# Patient Record
Sex: FEMALE | Race: WHITE | URBAN - METROPOLITAN AREA
[De-identification: names, ages, dates, MRNs, and addresses within clinical notes are randomized per-mention and may not be internally consistent; named-entity substitution may affect disease eponyms.]

---

## 2017-05-04 ENCOUNTER — OFFICE VISIT (OUTPATIENT)
Dept: ORTHOPEDIC SURGERY | Age: 22
End: 2017-05-04

## 2017-05-04 VITALS — BODY MASS INDEX: 21.66 KG/M2 | HEIGHT: 65 IN | WEIGHT: 130 LBS

## 2017-05-04 DIAGNOSIS — M79.671 RIGHT FOOT PAIN: ICD-10-CM

## 2017-05-04 DIAGNOSIS — M84.374A STRESS FRACTURE OF RIGHT FOOT, INITIAL ENCOUNTER: Primary | ICD-10-CM

## 2017-05-04 PROCEDURE — L4361 PNEUMA/VAC WALK BOOT PRE OTS: HCPCS | Performed by: ORTHOPAEDIC SURGERY

## 2017-05-04 PROCEDURE — 99203 OFFICE O/P NEW LOW 30 MIN: CPT | Performed by: ORTHOPAEDIC SURGERY

## 2017-05-04 RX ORDER — CLONAZEPAM 0.5 MG/1
TABLET ORAL
Refills: 3 | COMMUNITY
Start: 2017-04-25

## 2017-05-04 RX ORDER — SERTRALINE HYDROCHLORIDE 100 MG/1
TABLET, FILM COATED ORAL
COMMUNITY
Start: 2016-04-14 | End: 2018-07-03

## 2017-05-04 ASSESSMENT — ENCOUNTER SYMPTOMS
GASTROINTESTINAL NEGATIVE: 1
BACK PAIN: 0

## 2017-06-01 ENCOUNTER — OFFICE VISIT (OUTPATIENT)
Dept: ORTHOPEDIC SURGERY | Age: 22
End: 2017-06-01

## 2017-06-01 VITALS
HEART RATE: 73 BPM | DIASTOLIC BLOOD PRESSURE: 67 MMHG | HEIGHT: 65 IN | SYSTOLIC BLOOD PRESSURE: 119 MMHG | WEIGHT: 130 LBS | BODY MASS INDEX: 21.66 KG/M2

## 2017-06-01 DIAGNOSIS — M84.374D STRESS FRACTURE OF RIGHT FOOT WITH ROUTINE HEALING, SUBSEQUENT ENCOUNTER: ICD-10-CM

## 2017-06-01 DIAGNOSIS — M79.671 RIGHT FOOT PAIN: Primary | ICD-10-CM

## 2017-06-01 PROCEDURE — 99213 OFFICE O/P EST LOW 20 MIN: CPT | Performed by: ORTHOPAEDIC SURGERY

## 2018-07-03 ENCOUNTER — OFFICE VISIT (OUTPATIENT)
Dept: ORTHOPEDIC SURGERY | Age: 23
End: 2018-07-03

## 2018-07-03 VITALS
WEIGHT: 166.6 LBS | SYSTOLIC BLOOD PRESSURE: 115 MMHG | HEIGHT: 66 IN | HEART RATE: 69 BPM | BODY MASS INDEX: 26.78 KG/M2 | DIASTOLIC BLOOD PRESSURE: 77 MMHG

## 2018-07-03 DIAGNOSIS — M79.662 PAIN IN LEFT SHIN: Primary | ICD-10-CM

## 2018-07-03 DIAGNOSIS — S86.899A SHIN SPLINTS, INITIAL ENCOUNTER: ICD-10-CM

## 2018-07-03 DIAGNOSIS — M79.661 PAIN IN RIGHT SHIN: ICD-10-CM

## 2018-07-03 PROCEDURE — 99214 OFFICE O/P EST MOD 30 MIN: CPT | Performed by: ORTHOPAEDIC SURGERY

## 2018-07-03 ASSESSMENT — ENCOUNTER SYMPTOMS
RESPIRATORY NEGATIVE: 1
GASTROINTESTINAL NEGATIVE: 1
EYES NEGATIVE: 1

## 2018-07-03 NOTE — LETTER
[facility] [address] [facility] [address]  Phone: [phone]  Fax: [phone]    Kike Pardo MD  July 16, 2018     Enid Stein  [address] [address] [address]  [facility] Allegra Munoz    Patient: Darlene Sainz  MR Number: [id number]  YOB: 1995  Date of Visit: 7/3/2018    Dear Dr. Enid Stein: Below are the relevant portions of my assessment and plan of care. Subjective:      Patient ID: Darlene Sainz is a 23 y.o. female. Leg Pain      Darlene Sainz presents today for evaluation of bilateral shin pain. Pain started about a week ago. Left is worse than right. It hurts in the medial aspect of both shins. She has pain that can be as bad as 7 out of 10 with running. She has been on her feet all day and she will limp with walking. She has not had treatment. She does not take medications. She did recently get new shoes. She has been working out every day doing cardio and weights. She runs about 10 miles a week. She does not recall specific trauma. About 1 year ago I treated her for a stress injury in the right foot. Review of Systems   Constitutional: Negative. HENT: Negative. Eyes: Negative. Respiratory: Negative. Cardiovascular: Negative. Gastrointestinal: Negative. Endocrine: Negative. Genitourinary: Negative. Musculoskeletal: Positive for arthralgias. Skin: Negative. Allergic/Immunologic: Positive for environmental allergies. Negative for food allergies. Neurological: Negative. Hematological: Negative. Psychiatric/Behavioral: The patient is nervous/anxious. Objective:   Physical Exam  General Exam:    Vitals: Blood pressure 115/77, pulse 69, height 5' 6\" (1.676 m), weight 166 lb 9.6 oz (75.6 kg). Constitutional: Patient is adequately groomed with no evidence of malnutrition  Mental Status: The patient is oriented to time, place and person. The patient's mood and affect are appropriate. will proceed with an MRI. All of her questions have been answered. She agrees with this plan. This note was created using voice recognition software. It has been proofread, but occasionally errors remain. Please disregard these errors. They will be corrected as they are noted. If you have questions, please do not hesitate to call me. I look forward to following Joon Ball along with you.     Sincerely,      Claudene Lerner, MD

## 2018-07-16 NOTE — COMMUNICATION BODY
Subjective:      Patient ID: Janet Keith is a 23 y.o. female. Leg Pain      Janet Keith presents today for evaluation of bilateral shin pain. Pain started about a week ago. Left is worse than right. It hurts in the medial aspect of both shins. She has pain that can be as bad as 7 out of 10 with running. She has been on her feet all day and she will limp with walking. She has not had treatment. She does not take medications. She did recently get new shoes. She has been working out every day doing cardio and weights. She runs about 10 miles a week. She does not recall specific trauma. About 1 year ago I treated her for a stress injury in the right foot. Review of Systems   Constitutional: Negative. HENT: Negative. Eyes: Negative. Respiratory: Negative. Cardiovascular: Negative. Gastrointestinal: Negative. Endocrine: Negative. Genitourinary: Negative. Musculoskeletal: Positive for arthralgias. Skin: Negative. Allergic/Immunologic: Positive for environmental allergies. Negative for food allergies. Neurological: Negative. Hematological: Negative. Psychiatric/Behavioral: The patient is nervous/anxious. Objective:   Physical Exam  General Exam:    Vitals: Blood pressure 115/77, pulse 69, height 5' 6\" (1.676 m), weight 166 lb 9.6 oz (75.6 kg). Constitutional: Patient is adequately groomed with no evidence of malnutrition  Mental Status: The patient is oriented to time, place and person. The patient's mood and affect are appropriate. Gait:  Patient walks with normal gait and station. Lymphatic: The lymphatic examination bilaterally reveals all areas to be without enlargement or induration. Vascular: Examination reveals no swelling or calf tenderness. Peripheral pulses are palpable and 2+. Neurological: The patient has good coordination. There is no weakness or sensory deficit.     Skin:    Head/Neck: inspection reveals no rashes, ulcerations or lesions. Trunk:  inspection reveals no rashes, ulcerations or lesions. Right Lower Extremity: inspection reveals no rashes, ulcerations or lesions. Left Lower Extremity: inspection reveals no rashes, ulcerations or lesions. She has valgus alignment of the ankles bilaterally with moderate pes planus. On the right side she has tenderness over the anterior medial border of the tibia at the junction of the middle and distal third. There is no obvious deformity. She has no swelling. Calf is soft. Left side is symmetric to the right with a little bit more intense discomfort. Pain is at the junction of the middle and distal third the anterior medial border of the tibia. Calf is soft. Bilaterally she has no fibular pain or anterior tibial pain. Neurologic and vascular exams of both lower extremities are normal.    X-rays were obtained today AP and lateral views of the bilateral shins. These demonstrate:  No bony abnormalities. Assessment:      Bilateral shin splints        Plan:      I explained to the patient the etiology and treatment of this condition. I'm recommending a threefold approach. First is dedicated stretching. I demonstrated four different options one stretches to the patient and the office today. Second is activity modification. She will avoid running and Instead do biking, elliptical training, rowing, or swimming. She will use ice. Furthermore, she will consider power step inserts to offload her hindfoot valgus. If she has ongoing trouble in a month we will proceed with an MRI. All of her questions have been answered. She agrees with this plan. This note was created using voice recognition software. It has been proofread, but occasionally errors remain. Please disregard these errors. They will be corrected as they are noted.

## 2020-01-30 NOTE — PROGRESS NOTES
Chief Complaint   Patient presents with   Natalie Hassan New Doctor   [facility]     both eyes x 6 months on/ off     Hypertension     x4 months          HPI:  Neida Sousa is a 27 y.o. (: 1995) here today to establish care. Has history of general anxiety disorder with panic attacks. Has been on Zoloft in the past and most recently on Cymbalta 30 mg daily. Seeing a doc at [facility]. Goes about once per month. Dry eye: Last 6 months patient states that she has been having dry eye or allergies. Only happens towards the end of the day when her eyes feel like they need to be itched or needs some lubrication. When she rubs them her eyes will water and then her eyes feel a little bit better. No other symptoms like runny nose, cough or hives. Received 2 HPV vaccinations in the past year. Needs to complete series with third dose. Patient has never been sexually active and has never had a Pap smear. She will think about establishing with gynecology. Discussed increasing water intake and making healthy dietary choices. Review of Systems   Constitutional: Negative for activity change, appetite change, chills, fatigue, fever and unexpected weight change. HENT: Negative for congestion, postnasal drip, rhinorrhea, sinus pressure, sinus pain, sneezing and sore throat. Eyes: Negative for visual disturbance. Respiratory: Negative for cough, chest tightness, shortness of breath and wheezing. Cardiovascular: Negative for chest pain and palpitations. Gastrointestinal: Negative for abdominal pain, blood in stool, constipation, diarrhea, nausea and vomiting. Endocrine: Negative for cold intolerance, heat intolerance, polydipsia and polyuria. Genitourinary: Negative for dysuria, frequency, vaginal bleeding and vaginal discharge. Musculoskeletal: Negative for arthralgias, back pain, joint swelling, myalgias and neck pain. Skin: Negative for rash and wound.    Allergic/Immunologic: Negative for environmental allergies. Neurological: Negative for dizziness, tremors, syncope, weakness, light-headedness, numbness and headaches. Hematological: Negative for adenopathy. Psychiatric/Behavioral: Negative for behavioral problems, decreased concentration, sleep disturbance and suicidal ideas. The patient is not nervous/anxious. Past Medical History:   Diagnosis Date    Anxiety        Family History   Problem Relation Age of Onset    No Known Problems Mother     No Known Problems Father     No Known Problems Sister     No Known Problems Brother     No Known Problems Maternal Aunt     No Known Problems Maternal Uncle     No Known Problems Paternal Aunt     No Known Problems Paternal Uncle     No Known Problems Maternal Grandmother     No Known Problems Maternal Grandfather     No Known Problems Paternal Grandmother     No Known Problems Paternal Grandfather     No Known Problems Other     Diabetes Neg Hx     Anesth Problems Neg Hx     Broken Bones Neg Hx     Cancer Neg Hx     Clotting Disorder Neg Hx     Collagen Disease Neg Hx     Dislocations Neg Hx     Osteoporosis Neg Hx     Rheumatologic Disease Neg Hx     Scoliosis Neg Hx     Severe Sprains Neg Hx        Social History     Tobacco Use    Smoking status: Never Smoker    Smokeless tobacco: Never Used   Substance Use Topics    Alcohol use: Yes     Comment: rare    Drug use: Not on file       New Prescriptions    No medications on file       Meds Prior to visit:  Current Outpatient Medications on File Prior to Visit   Medication Sig Dispense Refill    DULoxetine (CYMBALTA) 30 MG extended release capsule Take 30 mg by mouth daily      clonazePAM (KLONOPIN) 0.5 MG tablet TAKE 1/2 TABLET PO QD PRF ANXIETY  3     No current facility-administered medications on file prior to visit.       No Known Allergies    OBJECTIVE:  /72   Pulse 82   Temp 98.1 °F (36.7 °C) (Oral)   Resp 12   Ht 5' 4.96\" (1.65 m)   Wt 172 lb (78 No organomegaly   Musculoskeletal: Normal range of motion. General: No tenderness. Comments: Intact in all extremities   Lymphadenopathy:      Cervical: No cervical adenopathy. Skin:     General: Skin is warm. Capillary Refill: Capillary refill takes less than 2 seconds. Findings: No erythema or rash. Neurological:      General: No focal deficit present. Mental Status: She is alert and oriented to person, place, and time. Mental status is at baseline. Cranial Nerves: No cranial nerve deficit. Sensory: No sensory deficit. Motor: No weakness or abnormal muscle tone. Coordination: Coordination normal.      Gait: Gait normal.      Deep Tendon Reflexes: Reflexes normal.   Psychiatric:         Mood and Affect: Mood normal.         Behavior: Behavior normal.         Thought Content: Thought content normal.         Judgment: Judgment normal.         ASSESSMENT/PLAN:  1. Encounter to establish care  VS reviewed and WNL    BMI reviewed   All questions answered. 2. Generalized anxiety disorder  History of anxiety. [facility] and now sees a doctor at [facility]. Was transitioned from Zoloft to Cymbalta. Cymbalta is working very well. Continue this for now  - DULoxetine (CYMBALTA) 30 MG extended release capsule; Take 30 mg by mouth daily    3. Need for HPV vaccination  Needs third dose  - HPV Vaccine 9-valent IM    4. Dry eye  Patient is likely dealing with dry eye. Recommended preservative free eyedrops. Discussed different brands like refresh optive. Will let me know, via MyChart, if she is doing better. If not we will reevaluate and possibly prescribe Restasis or refer to ophthalmology. 5.  Physical exam  F/u discussed. Healthy lifestyle modifications discussed. Discussed use, benefit, and side effects of prescribed medications. Barriers to medication compliance addressed. All patient questions answered. Pt voiced understanding.      RTC No follow-ups on file. No future appointments.     Harper Albright MD  1/31/2020  8:54 AM

## 2020-01-31 ENCOUNTER — OFFICE VISIT (OUTPATIENT)
Dept: PRIMARY CARE CLINIC | Age: 25
End: 2020-01-31
Payer: COMMERCIAL

## 2020-01-31 VITALS
RESPIRATION RATE: 12 BRPM | TEMPERATURE: 98.1 F | DIASTOLIC BLOOD PRESSURE: 72 MMHG | HEART RATE: 82 BPM | WEIGHT: 172 LBS | SYSTOLIC BLOOD PRESSURE: 109 MMHG | HEIGHT: 65 IN | OXYGEN SATURATION: 100 % | BODY MASS INDEX: 28.66 KG/M2

## 2020-01-31 PROCEDURE — 90651 9VHPV VACCINE 2/3 DOSE IM: CPT | Performed by: FAMILY MEDICINE

## 2020-01-31 PROCEDURE — 90471 IMMUNIZATION ADMIN: CPT | Performed by: FAMILY MEDICINE

## 2020-01-31 PROCEDURE — 99385 PREV VISIT NEW AGE 18-39: CPT | Performed by: FAMILY MEDICINE

## 2020-01-31 RX ORDER — DULOXETIN HYDROCHLORIDE 30 MG/1
30 CAPSULE, DELAYED RELEASE ORAL DAILY
COMMUNITY

## 2020-01-31 ASSESSMENT — ENCOUNTER SYMPTOMS
SINUS PRESSURE: 0
CONSTIPATION: 0
BLOOD IN STOOL: 0
NAUSEA: 0
DIARRHEA: 0
COUGH: 0
BACK PAIN: 0
VOMITING: 0
SORE THROAT: 0
CHEST TIGHTNESS: 0
RHINORRHEA: 0
SHORTNESS OF BREATH: 0
SINUS PAIN: 0
ABDOMINAL PAIN: 0
WHEEZING: 0

## 2020-01-31 ASSESSMENT — PATIENT HEALTH QUESTIONNAIRE - PHQ9
1. LITTLE INTEREST OR PLEASURE IN DOING THINGS: 0
2. FEELING DOWN, DEPRESSED OR HOPELESS: 0
SUM OF ALL RESPONSES TO PHQ9 QUESTIONS 1 & 2: 0
SUM OF ALL RESPONSES TO PHQ QUESTIONS 1-9: 0
SUM OF ALL RESPONSES TO PHQ QUESTIONS 1-9: 0

## 2020-02-10 ENCOUNTER — PATIENT MESSAGE (OUTPATIENT)
Dept: PRIMARY CARE CLINIC | Age: 25
End: 2020-02-10

## 2020-02-10 NOTE — TELEPHONE ENCOUNTER
From: Tank Quiñonez  To: Morro Linder MD  Sent: 2/10/2020 1:18 PM EST  Subject: Non-Urgent Medical Question    Hi. My current psychiatrist I was seeing for therapy just notified me he was leaving [place] for a job elsewhere. I will no longer be able to see him. Could you give me the names of a few Psychologists that I could be connected to? I believe that you mentioned one possibly being in your building. Thanks!

## 2020-04-13 ENCOUNTER — PATIENT MESSAGE (OUTPATIENT)
Dept: PRIMARY CARE CLINIC | Age: 25
End: 2020-04-13

## 2020-04-14 NOTE — TELEPHONE ENCOUNTER
From: Joon Berger  To: Juan Banegas MD  Sent: 4/13/2020 6:31 PM EDT  Subject: Non-Urgent Medical Question    No. I do not need to see Dr. Nataly Chambers tomorrow. What is the earliest available on May 11? Joon Berger      ----- Message -----   From:MAUREEN Ferrer   LPJV:0/10/2104 4:07 PM EDT   To:Lola Nolasco   Subject:RE: Non-Urgent Medical Question    Servando Larsen,    Your appointment with Dr Hannah Pimentel was scheduled for this morning. I am sorry but whoever you talked to in central scheduling this morning scheduled you with Dr Nataly Chambers for tomorrow. I can reschedule you to see Dr Hannah Pimentel on a Monday morning. Her first available is May 11th. That morning is pretty open just let me know what time works for you. Did you still want to see Dr. Nataly Chambers tomorrow? Kim         ----- Message -----   Shadi Escobedo   Sent:4/13/2020 3:51 PM EDT   To:Marilyn Bales MD   Subject:Non-Urgent Medical Question    Hi. I have an E-visit scheduled tomorrow with [facility]. The e-visit appointment in [address] lists you as the provider. I just wanted to make sure this was correct and that I would be able to connect with the E-visit properly. Thanks.     Joon Berger

## 2020-04-15 ENCOUNTER — PATIENT MESSAGE (OUTPATIENT)
Dept: PRIMARY CARE CLINIC | Age: 25
End: 2020-04-15

## 2020-04-16 ENCOUNTER — PATIENT MESSAGE (OUTPATIENT)
Dept: PRIMARY CARE CLINIC | Age: 25
End: 2020-04-16

## 2020-04-27 ENCOUNTER — TELEPHONE (OUTPATIENT)
Dept: PRIMARY CARE CLINIC | Age: 25
End: 2020-04-27

## 2020-05-11 ENCOUNTER — VIRTUAL VISIT (OUTPATIENT)
Dept: PSYCHOLOGY | Age: 25
End: 2020-05-11
Payer: COMMERCIAL

## 2020-05-11 PROCEDURE — 90791 PSYCH DIAGNOSTIC EVALUATION: CPT | Performed by: PSYCHOLOGIST

## 2020-05-11 NOTE — Clinical Note
Dr Marielena Freeman, Pt came from [address] [place] [address] psychiatry. I can refer for therapy. Would you be willing to manage duloxetine and clonazepam? Or do you want me to make referral for psychiatry as well?  Crystal

## 2020-05-11 NOTE — PROGRESS NOTES
Behavioral Health Consultation  Taya Barkley PsyD  Psychologist  5/11/2020  10:38 AM      NOTE - this visit conducted via audiovisual means : MyChart, Doxy, etc, in accordance with CMS guidelines. During TKBWN-65 public health emergency. Visit initiated at patient or caregiver request with permission to bill to insurer granted. Telemedicine APA guidelines were reviewed and discussed. Patient gave verbal consent for teleservices and will sign a consent form when feasible. Location of provider: [facility] care  Location of patient: home    Time spent with Patient: 25 minutes  This is patient's first  [facility] appointment. Reason for Consult:  anxiety  Referring Provider: Zoila Medellin MD  [facility]  2nd [address], [address]    Pt provided informed consent for the behavioral health program. Discussed with patient model of service to include the limits of confidentiality (i.e. abuse reporting, suicide intervention, etc.) and short-term intervention focused approach. Pt indicated understanding. Feedback given to PCP. S:    Presenting Problem (PP): anxiety    Problem hx: Per Dr Flo Cruz note on 1/31: \"Has history of general anxiety disorder with panic attacks. Has been on Zoloft in the past and most recently on Cymbalta 30 mg daily. Seeing a doc at [facility]. Goes about once per month. \"    UPDATE PP Hx: pt has solid understanding of her chronic anxiety disorder in the PAKO and panic disorder spectrum per her report. Actively engaged in treatment until provider at [address] [place] [address] made decision to leave system and with pt's new job [employer] does not take current insurance. She is needing referral for psychiatry and psychotherapy wants psychologist only, 5 years treatment 2 x per week then tapered to 1 x per month. Pt well versed in treatment and wanting to get back to this, possibly 2 x per month.     Past psych tx: 5 years of combination of psychiatric medication management and psychotherapy at [facility], was happy with treatment until provider made decision to leave city for work, 2 x per week initially ultimately tapering down to 1 x per month over the last year. Feels that she may need to go back to 2 x per month \"dose\"    Current psych med tx: duloxetine 30 mg, clonazepam .5 mg, 1/2 tablet daily per UC specialist     Work: [email or web address] ([employer]), works at [email or web address]    Psych ROS:      Depression: negative screen     Lissette: DENIES insomnia with increased energy, rapid speech, easily distracted or decreased attention, irritability, racing thoughts, expansive mood, increase in energy and goal directed behavior, grandiosity, flight of ideas     Anxiety:  see UPDATE PP Hx above    OCD:  none reported     Panic:  see UPDATE PP Hx above     Psychosis: denies A/VH, or delusions    Substance abuse: none reported     PTSD:  none reported    O:  MSE:    Appearance    alert, cooperative  Appetite normal  Sleep disturbance No  Fatigue No  Loss of pleasure No  Impulsive behavior No  Speech    normal rate, normal volume and well articulated  Mood    Stable, managing  Affect    normal affect  Thought Content    intact  Thought Process    linear, goal directed and coherent  Associations    logical connections  Insight    Good  Judgment    Intact  Orientation    oriented to person, place, time, and general circumstances  Memory    recent and remote memory intact  Attention/Concentration    intact  Morbid ideation No  Suicide Assessment    no suicidal ideation    History:    Medications:   Current Outpatient Medications   Medication Sig Dispense Refill    DULoxetine (CYMBALTA) 30 MG extended release capsule Take 30 mg by mouth daily      clonazePAM (KLONOPIN) 0.5 MG tablet TAKE 1/2 TABLET PO QD PRF ANXIETY  3     No current facility-administered medications for this visit.         Social History:   Social History     Socioeconomic History    Marital status: Single     Spouse name: Not on file    Number of children: Hx     Anesth Problems Neg Hx     Broken Bones Neg Hx     Cancer Neg Hx     Clotting Disorder Neg Hx     Collagen Disease Neg Hx     Dislocations Neg Hx     Osteoporosis Neg Hx     Rheumatologic Disease Neg Hx     Scoliosis Neg Hx     Severe Sprains Neg Hx      A:    See S: and UDPATE PP Hx above    Diagnosis:    PAKO, Panic disorder       Diagnosis Date    Anxiety      Other psychosocial and environmental problems    Safety risk: no si/hi risk    Plan:  Pt interventions:    Practiced assertive communication, Trained in strategies for increasing balanced thinking, Discussed benefits of referral for specialty care, Established rapport, Conducted functional assessment and Supportive techniques    Pt Behavioral Change Plan:    1. Consider [facility] for individual psychotherapy:    [address] [employer] [address] [place] [address], [address] [facility]   Phone [phone]  [place]: Henrique Mann, [address], [address]   Phone [phone]  [place]: DIVINE Perez [address] [facility]   Phone [phone]  [place]: Basil Garces PsyD    2. Consult with Dr Cristino Arevalo about managing psychiatric medications? 3.  No further follow up required with Dr Zuleika Pugh, return as needed

## 2020-09-10 ENCOUNTER — NURSE TRIAGE (OUTPATIENT)
Dept: OTHER | Facility: CLINIC | Age: 25
End: 2020-09-10

## 2020-09-10 NOTE — TELEPHONE ENCOUNTER
Patient called [facility]-service center [facility]) to schedule appointment with red flag complaint; transferred to Nurse Access for triage by Anisha Victoria (agent's name). Reason for Disposition   MODERATE pain (e.g., interferes with normal activities) and present > 3 days    Answer Assessment - Initial Assessment Questions  1. ONSET: \"When did the pain start? \"      Few weeks ago  2. LOCATION: \"Where is the pain located? \"      Left with bump to shoulder  3. PAIN: \"How bad is the pain? \" (Scale 1-10; or mild, moderate, severe)    - MILD (1-3): doesn't interfere with normal activities    - MODERATE (4-7): interferes with normal activities (e.g., work or school) or awakens from sleep    - SEVERE (8-10): excruciating pain, unable to do any normal activities, unable to move arm at all due to pain      Intermittent pain - 3-4/10  4. WORK OR EXERCISE: \"Has there been any recent work or exercise that involved this part of the body? \"      No  5. CAUSE: \"What do you think is causing the shoulder pain? \"      Unsure  6. OTHER SYMPTOMS: \"Do you have any other symptoms? \" (e.g., neck pain, swelling, rash, fever, numbness, weakness)      Lump to area  7. PREGNANCY: \"Is there any chance you are pregnant? \" \"When was your last menstrual period? \"      No    Protocols used: SHOULDER PAIN-ADULT-OH    Informed of disposition. Care advice as documented. Instructed to call back with worsening symptoms. Soft transfer to [facility] to schedule appointment as recommended. Please do not respond to the triage nurse through this encounter. Any subsequent communication should be directly with the patient.

## 2024-10-28 ENCOUNTER — TELEPHONE (OUTPATIENT)
Dept: ORTHOPEDIC SURGERY | Age: 29
End: 2024-10-28

## 2024-10-28 NOTE — TELEPHONE ENCOUNTER
Appointment Request     Patient requesting earlier appointment: Yes  Appointment offered to patient: NONE AVAILABLE  Patient Contact Number: [phone]     PLEASE SCHEDULE THIS PATIENT IS A NP SPOT.  I ONLY SEE FU SPOTS AVAILABLE.  SHE CAN BE REACHED AT THE ABOVE PHONE #